# Patient Record
Sex: FEMALE | Race: WHITE | NOT HISPANIC OR LATINO | URBAN - METROPOLITAN AREA
[De-identification: names, ages, dates, MRNs, and addresses within clinical notes are randomized per-mention and may not be internally consistent; named-entity substitution may affect disease eponyms.]

---

## 2023-10-24 ENCOUNTER — OFFICE (OUTPATIENT)
Dept: URBAN - METROPOLITAN AREA CLINIC 19 | Facility: CLINIC | Age: 68
End: 2023-10-24

## 2023-10-24 VITALS
SYSTOLIC BLOOD PRESSURE: 104 MMHG | WEIGHT: 180 LBS | HEIGHT: 62 IN | DIASTOLIC BLOOD PRESSURE: 70 MMHG | OXYGEN SATURATION: 99 % | HEART RATE: 67 BPM

## 2023-10-24 DIAGNOSIS — K21.9 GASTRO-ESOPHAGEAL REFLUX DISEASE WITHOUT ESOPHAGITIS: ICD-10-CM

## 2023-10-24 DIAGNOSIS — R13.10 DYSPHAGIA, UNSPECIFIED: ICD-10-CM

## 2023-10-24 PROCEDURE — 99204 OFFICE O/P NEW MOD 45 MIN: CPT

## 2023-10-24 RX ORDER — SODIUM SULFATE, MAGNESIUM SULFATE, AND POTASSIUM CHLORIDE 17.75; 2.7; 2.25 G/1; G/1; G/1
TABLET ORAL
Qty: 24 | Refills: 0 | Status: ACTIVE
Start: 2023-10-24

## 2023-10-24 RX ORDER — PANTOPRAZOLE SODIUM 40 MG/1
40 TABLET, DELAYED RELEASE ORAL
Qty: 0 | Refills: 0 | Status: COMPLETED
End: 2023-10-24

## 2023-10-24 RX ORDER — LANSOPRAZOLE 30 MG/1
CAPSULE, DELAYED RELEASE PELLETS ORAL
Qty: 60 | Refills: 5 | Status: ACTIVE
Start: 2023-10-24

## 2023-10-24 RX ORDER — FAMOTIDINE 20 MG/1
40 TABLET, FILM COATED ORAL
Qty: 0 | Refills: 0 | Status: COMPLETED
End: 2023-10-24

## 2023-10-24 NOTE — SERVICENOTES
Procedure and risks including but not limited to anesthesia, bleeding perforation, etc. were discussed with the patient in detail., 

MD Addendum: IKatarina MD, independently interviewed and examined this patient and made modifications to the final HPI, exam, impression, and plan as initially scribed by Adriane Crump NP.

## 2023-10-24 NOTE — SERVICEHPINOTES
68-year-old female is here for evaluation of trouble swallowing and chronic acid reflux.  She has had trouble swallowing for the last 3 weeks and has a sensation of food stuck in the middle of her neck. Having trouble with water, dry foods, meats, and bread.  She will often have to regurgitate her food.  History of chronic acid reflux. Taking pantoprazole 40 mg once daily and Pepcid 20 mg twice daily which does not control her symptoms.  Drinks one carbonated beverage daily.   Does not take any NSAIDs.  Often eats late at night. Denies nausea or vomiting.  Bowel movements are regular.  Denies melena, hematochezia, or hematemesis.  Appears to have lost 20 lbs since her last clinic visit in 2016 which has been intentional.  Denies abdominal pain. Informs me she had a colonoscopy over 10 years ago at [facility].  She also had a negative Cologuard test 6 years ago.  No family history of colon cancer, colon polyps, or IBD.  No cardiac issues, not taking any blood thinners or diet pills.
br
br    Evaluated in June 2016 for epigastric pain and nausea. Gastric emptying test in June 2016 was normal. HIDA scan with ejection fracture was 26.7% in February 2008. Abdominal ultrasound  in February 2008 revealed normal gallbladder with no gallstones, wall thickening or pericholecystic fluid.  Normal liver with no biliary ductal dilation.  Outside EGD with GI specialist in March 2008 revealed erosive gastritis. We don't have access to path report.

## 2024-02-23 ENCOUNTER — OFFICE (OUTPATIENT)
Dept: URBAN - METROPOLITAN AREA CLINIC 19 | Facility: CLINIC | Age: 69
End: 2024-02-23
Payer: MEDICARE

## 2024-02-23 VITALS
WEIGHT: 185 LBS | HEART RATE: 74 BPM | SYSTOLIC BLOOD PRESSURE: 126 MMHG | OXYGEN SATURATION: 96 % | DIASTOLIC BLOOD PRESSURE: 84 MMHG | HEIGHT: 62 IN

## 2024-02-23 DIAGNOSIS — K21.9 GASTRO-ESOPHAGEAL REFLUX DISEASE WITHOUT ESOPHAGITIS: ICD-10-CM

## 2024-02-23 DIAGNOSIS — R13.10 DYSPHAGIA, UNSPECIFIED: ICD-10-CM

## 2024-02-23 DIAGNOSIS — R10.11 RIGHT UPPER QUADRANT PAIN: ICD-10-CM

## 2024-02-23 PROCEDURE — 99214 OFFICE O/P EST MOD 30 MIN: CPT

## 2024-02-23 RX ORDER — LANSOPRAZOLE 30 MG/1
CAPSULE, DELAYED RELEASE PELLETS ORAL
Qty: 60 | Refills: 5 | Status: ACTIVE
Start: 2023-10-24

## 2024-02-23 NOTE — SERVICEHPINOTES
68-year-old female is here for a follow up visit.  Evaluated in October 2023 for trouble swallowing as well as chronic acid reflux.  Reports she has had no more episodes of difficulty swallowing.  Taking lansoprazole once daily and reports occasional breakthrough symptoms at night time.  She often forgets her nighttime dose.  She has quit taking fish oil and vitamin C.  Continues to take turmeric for joint pain.  Complains of mild intermittent dull right upper quadrant abdominal pain.  Pain is unrelated to eating or her bowel movements.  Reports severity 4/10.  Denies any fever or chills.  Bowel movements are regular.  Denies melena or hematochezia or hematemesis.  Appears to have gained 5 pounds since her last clinic visit in October 2023. No family history of colon cancer, colon polyps, or IBD.  No cardiac issues, not taking any blood thinners or weight loss medications.   Canceled her EGD and colonoscopy in December 2023 due to scheduling issues.Evaluated in June 2016 for epigastric pain and nausea. Gastric emptying test in June 2016 was normal. HIDA scan with ejection fracture was 26.7% in February 2008. Abdominal ultrasound  in February 2008 revealed normal gallbladder with no gallstones, wall thickening or pericholecystic fluid.  Normal liver with no biliary ductal dilation.  Outside EGD with GI specialist in March 2008 revealed erosive gastritis. We don't have access to path report.